# Patient Record
Sex: MALE | Race: WHITE | NOT HISPANIC OR LATINO
[De-identification: names, ages, dates, MRNs, and addresses within clinical notes are randomized per-mention and may not be internally consistent; named-entity substitution may affect disease eponyms.]

---

## 2022-04-13 ENCOUNTER — TRANSCRIPTION ENCOUNTER (OUTPATIENT)
Age: 32
End: 2022-04-13

## 2023-03-28 ENCOUNTER — TRANSCRIPTION ENCOUNTER (OUTPATIENT)
Age: 33
End: 2023-03-28

## 2023-03-28 ENCOUNTER — INPATIENT (INPATIENT)
Facility: HOSPITAL | Age: 33
LOS: 0 days | Discharge: HOME | DRG: 310 | End: 2023-03-28
Attending: INTERNAL MEDICINE | Admitting: INTERNAL MEDICINE
Payer: COMMERCIAL

## 2023-03-28 VITALS
HEART RATE: 171 BPM | WEIGHT: 169.98 LBS | DIASTOLIC BLOOD PRESSURE: 86 MMHG | OXYGEN SATURATION: 98 % | RESPIRATION RATE: 18 BRPM | SYSTOLIC BLOOD PRESSURE: 158 MMHG | TEMPERATURE: 98 F

## 2023-03-28 VITALS — TEMPERATURE: 98 F

## 2023-03-28 DIAGNOSIS — I48.91 UNSPECIFIED ATRIAL FIBRILLATION: ICD-10-CM

## 2023-03-28 DIAGNOSIS — R79.89 OTHER SPECIFIED ABNORMAL FINDINGS OF BLOOD CHEMISTRY: ICD-10-CM

## 2023-03-28 LAB
ALBUMIN SERPL ELPH-MCNC: 4.8 G/DL — SIGNIFICANT CHANGE UP (ref 3.3–5)
ALP SERPL-CCNC: 62 U/L — SIGNIFICANT CHANGE UP (ref 40–120)
ALT FLD-CCNC: 25 U/L — SIGNIFICANT CHANGE UP (ref 10–45)
ANION GAP SERPL CALC-SCNC: 13 MMOL/L — SIGNIFICANT CHANGE UP (ref 5–17)
AST SERPL-CCNC: 30 U/L — SIGNIFICANT CHANGE UP (ref 10–40)
BILIRUB SERPL-MCNC: 0.6 MG/DL — SIGNIFICANT CHANGE UP (ref 0.2–1.2)
BUN SERPL-MCNC: 7 MG/DL — SIGNIFICANT CHANGE UP (ref 7–23)
CALCIUM SERPL-MCNC: 9.6 MG/DL — SIGNIFICANT CHANGE UP (ref 8.4–10.5)
CHLORIDE SERPL-SCNC: 108 MMOL/L — SIGNIFICANT CHANGE UP (ref 96–108)
CO2 SERPL-SCNC: 20 MMOL/L — LOW (ref 22–31)
CREAT SERPL-MCNC: 0.73 MG/DL — SIGNIFICANT CHANGE UP (ref 0.5–1.3)
D DIMER BLD IA.RAPID-MCNC: <150 NG/ML DDU — SIGNIFICANT CHANGE UP
EGFR: 125 ML/MIN/1.73M2 — SIGNIFICANT CHANGE UP
GLUCOSE SERPL-MCNC: 117 MG/DL — HIGH (ref 70–99)
HCT VFR BLD CALC: 45.4 % — SIGNIFICANT CHANGE UP (ref 39–50)
HGB BLD-MCNC: 15.8 G/DL — SIGNIFICANT CHANGE UP (ref 13–17)
MAGNESIUM SERPL-MCNC: 1.9 MG/DL — SIGNIFICANT CHANGE UP (ref 1.6–2.6)
MCHC RBC-ENTMCNC: 30.2 PG — SIGNIFICANT CHANGE UP (ref 27–34)
MCHC RBC-ENTMCNC: 34.8 GM/DL — SIGNIFICANT CHANGE UP (ref 32–36)
MCV RBC AUTO: 86.6 FL — SIGNIFICANT CHANGE UP (ref 80–100)
NRBC # BLD: 0 /100 WBCS — SIGNIFICANT CHANGE UP (ref 0–0)
PLATELET # BLD AUTO: 368 K/UL — SIGNIFICANT CHANGE UP (ref 150–400)
POTASSIUM SERPL-MCNC: 4 MMOL/L — SIGNIFICANT CHANGE UP (ref 3.5–5.3)
POTASSIUM SERPL-SCNC: 4 MMOL/L — SIGNIFICANT CHANGE UP (ref 3.5–5.3)
PROT SERPL-MCNC: 7.7 G/DL — SIGNIFICANT CHANGE UP (ref 6–8.3)
RBC # BLD: 5.24 M/UL — SIGNIFICANT CHANGE UP (ref 4.2–5.8)
RBC # FLD: 13.2 % — SIGNIFICANT CHANGE UP (ref 10.3–14.5)
SODIUM SERPL-SCNC: 141 MMOL/L — SIGNIFICANT CHANGE UP (ref 135–145)
T3/T3 UPTAKE INDEX SERPL-RTO: 39 % — SIGNIFICANT CHANGE UP (ref 28–41)
T4 AB SER-ACNC: 5.14 UG/DL — SIGNIFICANT CHANGE UP (ref 4.5–11.7)
T4/T3 UPTAKE INDEX SERPL: 0.9 TBI — SIGNIFICANT CHANGE UP (ref 0.8–1.3)
WBC # BLD: 9.83 K/UL — SIGNIFICANT CHANGE UP (ref 3.8–10.5)
WBC # FLD AUTO: 9.83 K/UL — SIGNIFICANT CHANGE UP (ref 3.8–10.5)

## 2023-03-28 PROCEDURE — 99236 HOSP IP/OBS SAME DATE HI 85: CPT

## 2023-03-28 PROCEDURE — 99223 1ST HOSP IP/OBS HIGH 75: CPT

## 2023-03-28 PROCEDURE — 93010 ELECTROCARDIOGRAM REPORT: CPT

## 2023-03-28 PROCEDURE — 71045 X-RAY EXAM CHEST 1 VIEW: CPT | Mod: 26

## 2023-03-28 PROCEDURE — L9991: CPT

## 2023-03-28 RX ORDER — DILTIAZEM HCL 120 MG
30 CAPSULE, EXT RELEASE 24 HR ORAL EVERY 6 HOURS
Refills: 0 | Status: DISCONTINUED | OUTPATIENT
Start: 2023-03-28 | End: 2023-03-28

## 2023-03-28 RX ORDER — APIXABAN 2.5 MG/1
1 TABLET, FILM COATED ORAL
Qty: 60 | Refills: 0
Start: 2023-03-28 | End: 2023-04-26

## 2023-03-28 RX ORDER — POTASSIUM CHLORIDE 20 MEQ
40 PACKET (EA) ORAL ONCE
Refills: 0 | Status: DISCONTINUED | OUTPATIENT
Start: 2023-03-28 | End: 2023-03-28

## 2023-03-28 RX ORDER — DILTIAZEM HCL 120 MG
10 CAPSULE, EXT RELEASE 24 HR ORAL ONCE
Refills: 0 | Status: DISCONTINUED | OUTPATIENT
Start: 2023-03-28 | End: 2023-03-28

## 2023-03-28 RX ORDER — SODIUM CHLORIDE 9 MG/ML
500 INJECTION INTRAMUSCULAR; INTRAVENOUS; SUBCUTANEOUS ONCE
Refills: 0 | Status: COMPLETED | OUTPATIENT
Start: 2023-03-28 | End: 2023-03-28

## 2023-03-28 RX ORDER — DILTIAZEM HCL 120 MG
10 CAPSULE, EXT RELEASE 24 HR ORAL ONCE
Refills: 0 | Status: COMPLETED | OUTPATIENT
Start: 2023-03-28 | End: 2023-03-28

## 2023-03-28 RX ORDER — DILTIAZEM HCL 120 MG
60 CAPSULE, EXT RELEASE 24 HR ORAL EVERY 6 HOURS
Refills: 0 | Status: DISCONTINUED | OUTPATIENT
Start: 2023-03-28 | End: 2023-03-28

## 2023-03-28 RX ORDER — METOPROLOL TARTRATE 50 MG
1 TABLET ORAL
Qty: 30 | Refills: 1
Start: 2023-03-28 | End: 2023-05-26

## 2023-03-28 RX ORDER — APIXABAN 2.5 MG/1
5 TABLET, FILM COATED ORAL EVERY 12 HOURS
Refills: 0 | Status: DISCONTINUED | OUTPATIENT
Start: 2023-03-28 | End: 2023-03-28

## 2023-03-28 RX ORDER — METOPROLOL TARTRATE 50 MG
50 TABLET ORAL DAILY
Refills: 0 | Status: DISCONTINUED | OUTPATIENT
Start: 2023-03-28 | End: 2023-03-28

## 2023-03-28 RX ORDER — SODIUM CHLORIDE 9 MG/ML
250 INJECTION INTRAMUSCULAR; INTRAVENOUS; SUBCUTANEOUS ONCE
Refills: 0 | Status: COMPLETED | OUTPATIENT
Start: 2023-03-28 | End: 2023-03-28

## 2023-03-28 RX ADMIN — Medication 10 MILLIGRAM(S): at 09:35

## 2023-03-28 RX ADMIN — SODIUM CHLORIDE 500 MILLILITER(S): 9 INJECTION INTRAMUSCULAR; INTRAVENOUS; SUBCUTANEOUS at 09:40

## 2023-03-28 RX ADMIN — Medication 30 MILLIGRAM(S): at 07:37

## 2023-03-28 RX ADMIN — Medication 50 MILLIGRAM(S): at 17:01

## 2023-03-28 RX ADMIN — SODIUM CHLORIDE 500 MILLILITER(S): 9 INJECTION INTRAMUSCULAR; INTRAVENOUS; SUBCUTANEOUS at 13:53

## 2023-03-28 RX ADMIN — SODIUM CHLORIDE 500 MILLILITER(S): 9 INJECTION INTRAMUSCULAR; INTRAVENOUS; SUBCUTANEOUS at 07:37

## 2023-03-28 NOTE — DISCHARGE NOTE PROVIDER - CARE PROVIDER_API CALL
Tylor Canales)  Cardiac Electrophysiology  [address], [address] [place] [address]  Phone: [phone]  Fax: [phone]  Scheduled Appointment: 05/03/2023

## 2023-03-28 NOTE — DISCHARGE NOTE NURSING/CASE MANAGEMENT/SOCIAL WORK - NSDCPEFALRISK_GEN_ALL_CORE
For information on Fall & Injury Prevention, visit: [email or web address] OR  [email or web address] OR  https://www.cdc.gov/steadi/patient.html

## 2023-03-28 NOTE — CONSULT NOTE ADULT - SUBJECTIVE AND OBJECTIVE BOX
Electrophysiology Consult Note:     CHIEF COMPLAINT:  Patient is a 33y old  Male who presents with a chief complaint of Afib with RVR (28 Mar 2023 05:05)        HISTORY OF PRESENT ILLNESS:   HPI:  34 y/o M, with no significant PMH who presented to [facility] ED on 3/28/23 with complaints of palpitations that started 12 hours prior to arrival. Patient states that he was at a bachelor party in  for the weekend, where he drank alcohol daily and had both poor diet and sleep. Patient took a flight home earlier today. Since landing, patient has felt palpitations, anxious, and a fluttering feeling in his heart. Patient states that he had similar sx in the past that resolved on its own, but bought a cardiac monitor that checks his HR which told him he might be in afib, which brought him to the ED today. Patient denies CP, dizziness, headache, fever, chills, abdominal pain, diarrhea, recent drug use, or other complaints.   EPS called to evaluate.         PAST MEDICAL & SURGICAL HISTORY:  Asthma      No significant past surgical history          FAMILY HISTORY:      SOCIAL HISTORY:    [x ] Non-smoker    Allergies    No Known Allergies    Intolerances    	    MEDICATIONS:  MEDICATIONS  (STANDING):  apixaban 5 milliGRAM(s) Oral every 12 hours  diltiazem    Tablet 60 milliGRAM(s) Oral every 6 hours    MEDICATIONS  (PRN):        REVIEW OF SYSTEMS:  CONSTITUTIONAL: No fever, weight loss, or fatigue  EYES: No eye pain, visual disturbances, or discharge  ENMT:  No difficulty hearing, tinnitus, vertigo; No sinus or throat pain  NECK: No pain or stiffness  BREASTS: No pain, masses, or nipple discharge  RESPIRATORY: No cough, wheezing, chills or hemoptysis; No Shortness of Breath  CARDIOVASCULAR: No chest pain, palpitations, dizziness, or leg swelling  GASTROINTESTINAL: No abdominal or epigastric pain. No nausea, vomiting, or hematemesis; No diarrhea or constipation.  GENITOURINARY: No dysuria, frequency, hematuria, or incontinence      PHYSICAL EXAM:  Vital Signs Last 24 Hrs  T(C): 37.1 (28 Mar 2023 09:41), Max: 37.1 (28 Mar 2023 09:41)  T(F): 98.8 (28 Mar 2023 09:41), Max: 98.8 (28 Mar 2023 09:41)  HR: 122 (28 Mar 2023 09:41) (17 - 171)  BP: 160/72 (28 Mar 2023 09:41) (141/88 - 169/92)  BP(mean): 109 (28 Mar 2023 05:05) (109 - 109)  RR: 18 (28 Mar 2023 09:41) (18 - 19)  SpO2: 98% (28 Mar 2023 09:41) (97% - 98%)    Parameters below as of 28 Mar 2023 09:41  Patient On (Oxygen Delivery Method): room air      Daily Height in cm: 175.26 (28 Mar 2023 05:05)    Daily     Constitutional: NAD	  HEENT:   PERRL, EOMI	  CVS:  S1 S2, tachycardic ,  No JVD, No edema  Pulm: Lungs clear to auscultation	  GI:  Soft, Non-tender, + BS	  Ext: No LE edema  Neurologic: A&O x 3  Skin: No rash or lesion       	  LABS:	                         14.6   9.43  )-----------( 315      ( 28 Mar 2023 02:26 )             42.2     03-28    137  |  101  |  12  ----------------------------<  103<H>  2.8<LL>   |  21<L>  |  0.88    Ca    9.6      28 Mar 2023 02:26    TPro  7.2  /  Alb  4.9  /  TBili  0.5  /  DBili  x   /  AST  31  /  ALT  24  /  AlkPhos  58  03-28    proBNP:   Lipid Profile:   HgA1c:   TSH: Thyroid Stimulating Hormone, Serum: 5.580 uIU/mL (03-28 @ 02:26)  	      Telemetry: atrial fibrillation     Echo: PND

## 2023-03-28 NOTE — ED ADULT NURSE REASSESSMENT NOTE - NS ED NURSE REASSESS COMMENT FT1
patient verbalizes mild relief of palpitation, kept monitored and waiting for cardiology team for assessment.

## 2023-03-28 NOTE — H&P ADULT - ASSESSMENT
34 y/o M, with no significant PMH who presented to [facility] ED on 3/28/23 with complaints of palpitations that started 12 hours prior to arrival. Patient states that he was at a bachelor party in  for the weekend, where he drank alcohol daily and had both poor diet and sleep. Patient took a flight home earlier today. Since landing, patient has felt palpitations, anxious, and a fluttering feeling in his heart. Patient states that he had similar sx in the past that resolved on its own. In the ED, patient with afib with RVR, rates as high as 170s. Patient admitted to cardiology/telemetry for new onset atrial fibrillation with RVR.

## 2023-03-28 NOTE — DISCHARGE NOTE PROVIDER - HOSPITAL COURSE
INCOMPLETE !!!       32 y/o M, with no significant PMH who presented to [facility] ED on 3/28/23 with complaints of palpitations that started 12 hours prior to arrival. Patient states that he was at a bachelor party in  for the weekend, where he drank alcohol daily and had both poor diet and sleep. Patient took a flight home earlier today. Since landing, patient has felt palpitations, anxious, and a fluttering feeling in his heart. Patient states that he had similar sx in the past that resolved on its own. In the ED, patient with afib with RVR, rates as high as 170s. Patient admitted to cardiology/telemetry for new onset atrial fibrillation with RVR.  In the ED, patient received Diltiazem 20 mg IVP x 1 and Diltiazem 30 mg PO x 1 and 500 cc NS 0.9% bolus. - K: 2.8, repleted w/ 40 mEq x 1 and KCL 10 mg IV with repeat K 4. Pt ordered for diltiazem 60 PO q6hrs; EP consulted with plan for MADHAV/DCCV, However pt converted to SR on his own. As per EP team; pt will be sent loop recorder to his home, and they rec to send Toprol 50 mg and eliquis 5 mg  to pharmacy with recs to take it only PRN when he experiences palpitations and to f/u with EP team in 4 weeks.      D-dimer:     ECHO 3/28/23:     TSH 5.580; T4 WNL, T3 ------          INCOMPLETE !!!       32 y/o M, with no significant PMH who presented to [facility] ED on 3/28/23 with complaints of palpitations that started 12 hours prior to arrival. Patient states that he was at a bachelor party in  for the weekend, where he drank alcohol daily and had both poor diet and sleep. Patient took a flight home earlier today. Since landing, patient has felt palpitations, anxious, and a fluttering feeling in his heart. Patient states that he had similar sx in the past that resolved on its own. In the ED, patient with afib with RVR, rates as high as 170s. Patient admitted to cardiology/telemetry for new onset atrial fibrillation with RVR.  In the ED, patient received Diltiazem 20 mg IVP x 1 and Diltiazem 30 mg PO x 1 and 500 cc NS 0.9% bolus. - K: 2.8, repleted w/ 40 mEq x 1 and KCL 10 mg IV with repeat K 4. Pt ordered for diltiazem 60 PO q6hrs; EP consulted with plan for MADHAV/DCCV, However pt converted to SR on his own. As per EP team; pt will be sent loop recorder to his home, and they rec to send Toprol 50 mg and eliquis 5 mg to pharmacy with recs to take it only PRN when he experiences palpitations and to f/u with EP team in 4 weeks.      D-dimer: <150 (negative)    ECHO 3/28/23:     TSH 5.580; T4 WNL. Recommend patient follow up outpatient for a repeat TSH, to be addressed with outpatient PCP.        34 y/o M, with no significant PMH who presented to [facility] ED on 3/28/23 with complaints of palpitations that started 12 hours prior to arrival. Patient states that he was at a bachelor party in  for the weekend, where he drank alcohol daily and had both poor diet and sleep. Patient took a flight home earlier today. Since landing, patient has felt palpitations, anxious, and a fluttering feeling in his heart. Patient states that he had similar sx in the past that resolved on its own. In the ED, patient with afib with RVR, rates as high as 170s. Patient admitted to cardiology/telemetry for new onset atrial fibrillation with RVR.  In the ED, patient received Diltiazem 20 mg IVP x 1 and Diltiazem 30 mg PO x 1 and 500 cc NS 0.9% bolus. - K: 2.8, repleted w/ 40 mEq x 1 and KCL 10 mg IV with repeat K 4. Pt ordered for diltiazem 60 PO q6hrs; EP consulted with plan for MADHAV/DCCV, However pt converted to SR on his own. As per EP team; pt will be sent an external heart monitor to his home, and they rec to send Toprol 50 mg and eliquis 5 mg to pharmacy with recs to take it only PRN when he experiences palpitations and to f/u with EP team in 4 weeks.  Outpatient echocardiogram will be done at the EP follow up visit.     D-dimer: <150 (negative)    TSH 5.580; T4 WNL. Recommend patient follow up outpatient for a repeat TSH, to be addressed with outpatient PCP.     Pt is asymptomatic at this time and denies chest pain, SOB, CHRISTENSEN, palpitations, dizziness, LOC, N/V, diaphoresis, orthopnea/PND, and leg swelling. Pt able to ambulate and void without complication. VSS. Labs and telemetry reviewed. Pt is a candidate for discharge per Dr. Fofana. Pt given appropriate discharge instructions, pt states they have an appropriate amount of their previous home meds unchanged from this visit at home, and any new medications were sent to their pharmacy. Pt instructed to f/u with Dr. Tylor Meyer in 4 weeks.    Discharge Medications:  apixaban 5 mg oral tablet  Metoprolol Succinate ER 50 mg oral tablet, extended release

## 2023-03-28 NOTE — DISCHARGE NOTE PROVIDER - NSDCCPCAREPLAN_GEN_ALL_CORE_FT
PRINCIPAL DISCHARGE DIAGNOSIS  Diagnosis: Atrial fibrillation with RVR  Assessment and Plan of Treatment: You were found to be in atrial fibrillation here in the hospital.  WE were planning to get a MADHAV/cardioversion, However, you self converted to normal rhythm on your own not requiring any intervention.   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    - Please avoid drinking alcohol to prevent going to this heart rhythm again.   - You were seen by our electrohysiology team in the hospital and you will be sent a loop recorder to monitor your heart rhythm at home. Please follow up with our EP team in 4 weeks.   - We have also prescribed you Eliquis and Toprol; please take this only when you start experiencing  palpitations again, , please follow up with EP team sooner than 4 weeks.   People with atrial fibrillation (a type of irregular heartbeat) are at an increased risk of forming a blood clot in the heart, which can travel to the brain, causing a stroke, or to other parts of the body. ELIQUIS lowers your chance of having a stroke by helping to prevent clots from forming. If you stop taking ELIQUIS, you may have increased risk of forming a clot in your blood.         SECONDARY DISCHARGE DIAGNOSES  Diagnosis: Abnormal thyroid stimulating hormone level  Assessment and Plan of Treatment: Please have your thyroid levels recehcked with your primary care provider.     PRINCIPAL DISCHARGE DIAGNOSIS  Diagnosis: Atrial fibrillation with RVR  Assessment and Plan of Treatment: You were found to be in atrial fibrillation here in the hospital.  WE were planning to get a MADHAV/cardioversion, However, you self converted to normal rhythm on your own not requiring any intervention.   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    - Please avoid drinking alcohol to prevent going to this heart rhythm again.   - You were seen by our electrohysiology team in the hospital and you will be sent a loop recorder to monitor your heart rhythm at home. Please follow up with our EP team in 4 weeks.   - We have also prescribed you Eliquis and Toprol; please take this only when you start experiencing  palpitations again. Please note that you should take the Eliquis within 48 hours of symptom onset and to contct the EP team as soon as you start experiencing symptoms.   - please follow up with EP team sooner than 4 weeks.   People with atrial fibrillation (a type of irregular heartbeat) are at an increased risk of forming a blood clot in the heart, which can travel to the brain, causing a stroke, or to other parts of the body. ELIQUIS lowers your chance of having a stroke by helping to prevent clots from forming. If you stop taking ELIQUIS, you may have increased risk of forming a clot in your blood.         SECONDARY DISCHARGE DIAGNOSES  Diagnosis: Abnormal thyroid stimulating hormone level  Assessment and Plan of Treatment: Please have your thyroid levels rechecked with your primary care provider.

## 2023-03-28 NOTE — H&P ADULT - PROBLEM SELECTOR PLAN 2
TSH: 5.580; f/u t3/t4    F: IV NS 0.9% 500 cc bolus x 1  E: K >4, Mg > 2  N: NPO for possible EP plan   DVT: eliquis   Dispo: pending EP eval

## 2023-03-28 NOTE — H&P ADULT - PROBLEM SELECTOR PLAN 1
Patient w/ afib with RVR, rates to 170s, after being away w/ inc ETOH, dec sleep, recent flight   - In the ED, patient received Diltiazem 20 mg IVP x 1 and Diltiazem 30 mg PO x 1 and 500 cc NS 0.9% bolus  - ECHO ordered   - K: 2.8, repleted w/ 40 mEq x 1 and KCL 10 mg IV  - TSH: 5.580, ft3/t4  - Rate control: Continue Diltiazem 30 mg q6h  - Started A/C with lovenox in ED, start eliquis 5 mg BID   - EP Consult for possible MADHAV/DCCV

## 2023-03-28 NOTE — ED ADULT NURSE NOTE - OBJECTIVE STATEMENT
CC of rapid afib x today similarly happened last 2 years ago but resolved on its own. Denies any med Hx. mentioned that he is in a lot of stress at work lately and his wedding is on the 4th of July. Hooked to cardiac monitor, bilateral unequal pulses, rapid. Upgraded to Jose Raul ARROYO and seen immediately.

## 2023-03-28 NOTE — DISCHARGE NOTE PROVIDER - NSDCFUSCHEDAPPT_GEN_ALL_CORE_FT
Tylor Canales  [facility] Physician [facility]  [facility] [address]  Scheduled Appointment: 05/03/2023

## 2023-03-28 NOTE — DISCHARGE NOTE NURSING/CASE MANAGEMENT/SOCIAL WORK - PATIENT PORTAL LINK FT
You can access the FollowMyHealth Patient Portal offered by [facility] by registering at the following website: [email or web address]. By joining [facility]’s FollowMyHealth portal, you will also be able to view your health information using other applications (apps) compatible with our system.

## 2023-03-28 NOTE — ED ADULT NURSE REASSESSMENT NOTE - NS ED NURSE REASSESS COMMENT FT1
tried to call [address] but unable to receive due nurse staffing issues. maintained connected to the cardiac monitor

## 2023-03-28 NOTE — DISCHARGE NOTE PROVIDER - NSDCMRMEDTOKEN_GEN_ALL_CORE_FT
apixaban 5 mg oral tablet: 1 tab(s) orally every 12 hours  Metoprolol Succinate ER 50 mg oral tablet, extended release: 1 tab(s) orally once a day

## 2023-03-28 NOTE — CONSULT NOTE ADULT - ASSESSMENT
34 y/o M, with no significant PMH who presented to [facility] ED on 3/28/23 with complaints of palpitations that started 12 hours prior to arrival. Patient was noted to be in atrial fibrillation with RVR, he was started on Eliquis and Diltiazem.   He self converted to sinus rhythm without pauses, but had episodes of sinus tachycardia up to 150 bpm. At this time no need for EP interventions, will start Metoprolol 50 mg for tachycardia to take as needed, continue ELiquis 5 mg to take if patient reverts back in atrial fibrillation. Will arrange cardiac monitor to evaluate for arrythmia burden and follow up with EPS as out patient. Would repeat TSH as out patient and follow up echocardiogram , K was corrected 4.0 .  32 y/o M, with no significant PMH who presented to [facility] ED on 3/28/23 with complaints of palpitations that started 12 hours prior to arrival. Patient was noted to be in atrial fibrillation with RVR, he was started on Eliquis and Diltiazem.   He self converted to sinus rhythm without pauses, but had episodes of sinus tachycardia up to 150 bpm. At this time no need for EP interventions, will start Metoprolol 50 mg for tachycardia to take as needed, continue ELiquis 5 mg to take if patient reverts back in atrial fibrillation. Will arrange cardiac monitor to evaluate for arrythmia burden and follow up with EPS as out patient 5/3/23 Dr. Canales .  Would repeat TSH as out patient and follow up echocardiogram , K was corrected 4.0 .

## 2023-03-28 NOTE — H&P ADULT - HISTORY OF PRESENT ILLNESS
32 y/o M, with no significant PMH who presented to [facility] ED on 3/28/23 with complaints of palpitations that started ______ earlier   Patient recently went to [facility] for ______ states that he drank   Patient states that he had similiar symptoms in the past   Patient denies chest pain, dizziness, headache, fever, chills, LE edema, orthopnea, or other complaints.     In the ED, VS: T 98F, BP: 158/86 mmHg, RR: 18 breaths/min, spO2: 98% on RA, HR: 117 bpm  Labs significant for: K: 2.8, TSH: 5.580. COVID: (-), EKG:   CXR: Heart, lungs, mediastinum and thorax are unremarkable.  Patient received:   Patient admitted to cardiology/telemetry for new onset atrial fibrillation w/ RVR.    32 y/o M, with no significant PMH who presented to [facility] ED on 3/28/23 with complaints of palpitations that started 12 hours prior to arrival. Patient states that he was at a bachelor party in  for the weekend, where he drank alcohol daily and had both poor diet and sleep. Patient took a flight home earlier today. Since landing, patient has felt palpitations, anxious, and a fluttering feeling in his heart. Patient states that he had similar sx in the past that resolved on its own, but bought a cardiac monitor that checks his HR which told him he might be in afib, which brought him to the ED today. Patient denies CP, dizziness, headache, fever, chills, abdominal pain, diarrhea, recent drug use, or other complaints.     In the ED, VS: T 98F, BP: 158/86 mmHg, RR: 18 breaths/min, spO2: 98% on RA, HR: 1 bpm  Labs significant for: K: 2.8, TSH: 5.580. COVID: (-), EKG:   CXR: Heart, lungs, mediastinum and thorax are unremarkable.  Patient received: Diltiazem 20 mg IVP x1, Diltiazem 30 mg PO x 1, Clonazepam 0.05 mg PO x 1, Potassium 40 mEq PO x 1, Potassium 10 mg IVP x1, and Lovenox 40 mg SC.  Patient admitted to cardiology/telemetry for new onset atrial fibrillation w/ RVR.    32 y/o M, with no significant PMH who presented to [facility] ED on 3/28/23 with complaints of palpitations that started 12 hours prior to arrival. Patient states that he was at a bachelor party in  for the weekend, where he drank alcohol daily and had both poor diet and sleep. Patient took a flight home earlier today. Since landing, patient has felt palpitations, anxious, and a fluttering feeling in his heart. Patient states that he had similar sx in the past that resolved on its own, but bought a cardiac monitor that checks his HR which told him he might be in afib, which brought him to the ED today. Patient denies CP, dizziness, headache, fever, chills, abdominal pain, diarrhea, recent drug use, or other complaints.     In the ED, VS: T 98F, BP: 158/86 mmHg, RR: 18 breaths/min, spO2: 98% on RA, HR: 1 bpm  Labs significant for: K: 2.8, TSH: 5.580. COVID: (-),  CXR: Heart, lungs, mediastinum and thorax are unremarkable.  Patient received: Diltiazem 20 mg IVP x1, Diltiazem 30 mg PO x 1, Clonazepam 0.05 mg PO x 1, Potassium 40 mEq PO x 1, Potassium 10 mg IVP x1, and Lovenox 40 mg SC.  Patient admitted to cardiology/telemetry for new onset atrial fibrillation w/ RVR.

## 2023-03-28 NOTE — PATIENT PROFILE ADULT - FALL HARM RISK - RISK INTERVENTIONS

## 2023-03-31 PROBLEM — Z00.00 ENCOUNTER FOR PREVENTIVE HEALTH EXAMINATION: Status: ACTIVE | Noted: 2023-03-31

## 2023-04-03 DIAGNOSIS — R00.0 TACHYCARDIA, UNSPECIFIED: ICD-10-CM

## 2023-04-03 DIAGNOSIS — J45.909 UNSPECIFIED ASTHMA, UNCOMPLICATED: ICD-10-CM

## 2023-04-03 DIAGNOSIS — I48.91 UNSPECIFIED ATRIAL FIBRILLATION: ICD-10-CM

## 2023-04-03 DIAGNOSIS — R94.6 ABNORMAL RESULTS OF THYROID FUNCTION STUDIES: ICD-10-CM

## 2023-04-03 DIAGNOSIS — F41.0 PANIC DISORDER [EPISODIC PAROXYSMAL ANXIETY]: ICD-10-CM

## 2023-04-03 DIAGNOSIS — I49.8 OTHER SPECIFIED CARDIAC ARRHYTHMIAS: ICD-10-CM

## 2023-05-06 ENCOUNTER — APPOINTMENT (OUTPATIENT)
Dept: HEART AND VASCULAR | Facility: CLINIC | Age: 33
End: 2023-05-06
Payer: COMMERCIAL

## 2023-05-06 PROCEDURE — 93272 ECG/REVIEW INTERPRET ONLY: CPT

## 2023-05-10 ENCOUNTER — NON-APPOINTMENT (OUTPATIENT)
Age: 33
End: 2023-05-10

## 2023-05-10 ENCOUNTER — APPOINTMENT (OUTPATIENT)
Dept: HEART AND VASCULAR | Facility: CLINIC | Age: 33
End: 2023-05-10
Payer: COMMERCIAL

## 2023-05-10 VITALS
HEART RATE: 118 BPM | SYSTOLIC BLOOD PRESSURE: 143 MMHG | BODY MASS INDEX: 24.2 KG/M2 | WEIGHT: 169 LBS | DIASTOLIC BLOOD PRESSURE: 89 MMHG | HEIGHT: 70 IN

## 2023-05-10 DIAGNOSIS — I48.0 PAROXYSMAL ATRIAL FIBRILLATION: ICD-10-CM

## 2023-05-10 PROCEDURE — 93000 ELECTROCARDIOGRAM COMPLETE: CPT

## 2023-05-10 PROCEDURE — 99214 OFFICE O/P EST MOD 30 MIN: CPT | Mod: 25

## 2023-05-10 RX ORDER — METOPROLOL SUCCINATE 50 MG/1
50 TABLET, EXTENDED RELEASE ORAL DAILY
Refills: 0 | Status: ACTIVE | COMMUNITY
Start: 2023-05-10

## 2023-05-10 RX ORDER — APIXABAN 5 MG/1
5 TABLET, FILM COATED ORAL
Qty: 180 | Refills: 3 | Status: ACTIVE | COMMUNITY
Start: 2023-05-10

## 2023-05-11 PROBLEM — J45.909 UNSPECIFIED ASTHMA, UNCOMPLICATED: Chronic | Status: ACTIVE | Noted: 2023-03-28

## 2023-05-15 NOTE — REASON FOR VISIT
[Arrhythmia/ECG Abnorrmalities] : arrhythmia/ECG abnormalities [FreeTextEntry1] : 32 y/o M, with no significant PMH who presented to [facility] ED on 3/28/23 with AF in the setting of binge drinking in DR and K+ 2.8. Episode began roughly 12 hours prior to ED arrival. His K was repleted and he was given diltiazem and IVF and he self-converted. \par \par \par Patient admitted to cardiology/telemetry for new onset atrial fibrillation with RVR. He was discharged with Toprol 50 mg and Eliquis 5 mg BID to pharmacy with recs to take it only PRN when he experiences palpitations. He wore a 30 day monitor which didn’t show recurrent AF. He has episodes of ST which are associated with anxiety. One episode up to 190bpm which he attributes to "Sunday scaries." He has started seeing a psychiatrist/psychologist.  \par \par Patient notes that he'd had three other sustained palpitation episodes in his lifetime prior to this episode but this was the first episode recorded on EKG.\par \par He is very anxious and presents in ST.

## 2023-05-15 NOTE — ADDENDUM
[FreeTextEntry1] :  I, Tylor Canales, personally performed the services described in the documentation, reviewed the documentation recorded by the scribe in my presence and it accurately and completely records my words and actions.\par

## 2023-05-15 NOTE — END OF VISIT
[FreeTextEntry3] : I, Arlin Finch, am scribing for (in the presence of) Dr. Canales the following sections: HPI, PMH,Family/social history, ROS, Physical Exam, Assessment / Plan. \par \par I, Tylor Canales, personally performed the services described in the documentation, reviewed the documentation recorded by the scribe in my presence and it accurately and completely records my words and actions.

## 2023-09-13 NOTE — H&P ADULT - RESPIRATORY
normal/clear to auscultation bilaterally/no wheezes/no rales/no rhonchi
Regular Diet - No restrictions/DASH Diet/Low Sodium Diet/Consistent Carbohydrate Diabetic Diets/Renal Diets (for dialysis)

## 2024-03-01 PROCEDURE — 83735 ASSAY OF MAGNESIUM: CPT

## 2024-03-01 PROCEDURE — 87635 SARS-COV-2 COVID-19 AMP PRB: CPT

## 2024-03-01 PROCEDURE — 84484 ASSAY OF TROPONIN QUANT: CPT

## 2024-03-01 PROCEDURE — 84479 ASSAY OF THYROID (T3 OR T4): CPT

## 2024-03-01 PROCEDURE — 84436 ASSAY OF TOTAL THYROXINE: CPT

## 2024-03-01 PROCEDURE — 93005 ELECTROCARDIOGRAM TRACING: CPT

## 2024-03-01 PROCEDURE — 84443 ASSAY THYROID STIM HORMONE: CPT

## 2024-03-01 PROCEDURE — 36415 COLL VENOUS BLD VENIPUNCTURE: CPT

## 2024-03-01 PROCEDURE — G0378: CPT

## 2024-03-01 PROCEDURE — 99285 EMERGENCY DEPT VISIT HI MDM: CPT

## 2024-03-01 PROCEDURE — 80053 COMPREHEN METABOLIC PANEL: CPT

## 2024-03-01 PROCEDURE — 85027 COMPLETE CBC AUTOMATED: CPT

## 2024-03-01 PROCEDURE — 71045 X-RAY EXAM CHEST 1 VIEW: CPT

## 2024-03-01 PROCEDURE — 85379 FIBRIN DEGRADATION QUANT: CPT
